# Patient Record
Sex: MALE | Race: WHITE | NOT HISPANIC OR LATINO | URBAN - METROPOLITAN AREA
[De-identification: names, ages, dates, MRNs, and addresses within clinical notes are randomized per-mention and may not be internally consistent; named-entity substitution may affect disease eponyms.]

---

## 2017-08-26 ENCOUNTER — EMERGENCY (EMERGENCY)
Facility: HOSPITAL | Age: 32
LOS: 1 days | Discharge: HOME | End: 2017-08-26
Attending: EMERGENCY MEDICINE | Admitting: EMERGENCY MEDICINE
Payer: MEDICARE

## 2017-08-26 VITALS
OXYGEN SATURATION: 100 % | TEMPERATURE: 99 F | RESPIRATION RATE: 16 BRPM | HEART RATE: 95 BPM | SYSTOLIC BLOOD PRESSURE: 136 MMHG | DIASTOLIC BLOOD PRESSURE: 95 MMHG

## 2017-08-26 DIAGNOSIS — Z92.89 PERSONAL HISTORY OF OTHER MEDICAL TREATMENT: Chronic | ICD-10-CM

## 2017-08-26 DIAGNOSIS — M67.00 SHORT ACHILLES TENDON (ACQUIRED), UNSPECIFIED ANKLE: Chronic | ICD-10-CM

## 2017-08-26 PROCEDURE — 99284 EMERGENCY DEPT VISIT MOD MDM: CPT

## 2017-08-26 PROCEDURE — 73600 X-RAY EXAM OF ANKLE: CPT | Mod: 26,LT

## 2017-08-26 PROCEDURE — 73590 X-RAY EXAM OF LOWER LEG: CPT | Mod: 26,LT

## 2017-08-26 PROCEDURE — 73620 X-RAY EXAM OF FOOT: CPT | Mod: 26,LT

## 2017-08-26 NOTE — ED PROVIDER NOTE - CARE PLAN
Principal Discharge DX:	Ankle pain  Instructions for follow-up, activity and diet:	Take motrin 600mg (3 advil) every 8 hours as needed for pain. Rest, ice, elevate extremity. Avoid any strenuous activity. Follow up with an orthopedic if symptoms persist. Return to ED for any worsening symptoms.

## 2017-08-26 NOTE — ED PROVIDER NOTE - MUSCULOSKELETAL, MLM
Spine appears normal, range of motion is not limited, appears to withdraw to pain when left lat mal is palpated

## 2017-08-26 NOTE — ED PROVIDER NOTE - PLAN OF CARE
Take motrin 600mg (3 advil) every 8 hours as needed for pain. Rest, ice, elevate extremity. Avoid any strenuous activity. Follow up with an orthopedic if symptoms persist. Return to ED for any worsening symptoms.

## 2017-08-26 NOTE — ED PROVIDER NOTE - PROGRESS NOTE DETAILS
ALE Lamb: Received sign out from Dr. Anthony. Prelim xrays neg for acute fx; will dc home with nsaids, ortho follow up.

## 2017-08-26 NOTE — ED ADULT TRIAGE NOTE - CHIEF COMPLAINT QUOTE
father states pt tripped and fell and left ankle rolled. Pt has been walking "gingerly" as per father

## 2017-08-26 NOTE — ED PROVIDER NOTE - OBJECTIVE STATEMENT
32y M with PMHx of developmental delay presents to the ED with left leg injury s/p fall today. Pt tripped coming out of kitchen and fell onto left side. Pt's father noticed he is favoring his right leg. No head injury, no LOC. No hx of fractures. Allergic to sulfa drugs.

## 2017-08-26 NOTE — ED PROVIDER NOTE - MEDICAL DECISION MAKING DETAILS
father concern that patient has left leg s/p fall.  patient unable to give further hx.  withdraws on pain at navicular.  will image lower left leg.  no other injuries noted

## 2018-07-05 ENCOUNTER — EMERGENCY (EMERGENCY)
Facility: HOSPITAL | Age: 33
LOS: 1 days | Discharge: HOME | End: 2018-07-05
Attending: EMERGENCY MEDICINE | Admitting: EMERGENCY MEDICINE
Payer: MEDICARE

## 2018-07-05 DIAGNOSIS — Z92.89 PERSONAL HISTORY OF OTHER MEDICAL TREATMENT: Chronic | ICD-10-CM

## 2018-07-05 DIAGNOSIS — M67.00 SHORT ACHILLES TENDON (ACQUIRED), UNSPECIFIED ANKLE: Chronic | ICD-10-CM

## 2018-07-05 PROCEDURE — 99282 EMERGENCY DEPT VISIT SF MDM: CPT

## 2018-07-05 RX ORDER — CIPROFLOXACIN AND DEXAMETHASONE 3; 1 MG/ML; MG/ML
4 SUSPENSION/ DROPS AURICULAR (OTIC)
Qty: 10 | Refills: 0
Start: 2018-07-05 | End: 2018-07-11

## 2018-07-05 NOTE — ED PROVIDER NOTE - CARE PLAN
Principal Discharge DX:	Ear discomfort, right  Assessment and plan of treatment:	Administer ear drops if any signs of infection noted

## 2018-07-05 NOTE — ED ADULT TRIAGE NOTE - NS ED NOTE AC HIGH RISK COUNTRIES
Updated note from [facility]:  Good afternoon Dr. Fredo Silva,               I have provided additional psychiatry referrals for the patient; however, none are not accepting worker's comp.  I have also reached out to other professionals and have had no luck in finding No

## 2018-07-05 NOTE — ED ADULT TRIAGE NOTE - CHIEF COMPLAINT QUOTE
Pt with history of MR, brought by father who witnessed a bug crawling into his right ear. Unable to obtain VS in triage

## 2018-07-05 NOTE — ED PROVIDER NOTE - ATTENDING CONTRIBUTION TO CARE
32M hx of MR presenting with possible foreign body in ear today. Father noticed a small beetle crawl in patient's ear this morning. Patient was noted to be picking at ear after. Unsure if insect came out or not. No report of fever, bleeding, complaint of pain, inability to hear. Patient has severe MR and cannot give history On exam: Severe MR, not grabbing at ear except when ear being examined.  R ext aud canal showed some blood, and minor abrasion but no FB or insect. Intact TM. DC home F/U ENT if symptomatic

## 2018-07-05 NOTE — ED PROVIDER NOTE - OBJECTIVE STATEMENT
32M hx of MR presenting with possible foreign body in ear today. Father noticed a small beetle crawl in patient's ear this morning. Patient was noted to be picking at ear after. Unsure if insect came out or not. No report of fever, bleeding, complaint of pain, inability to hear.

## 2020-11-05 ENCOUNTER — EMERGENCY (EMERGENCY)
Facility: HOSPITAL | Age: 35
LOS: 1 days | Discharge: HOME | End: 2020-11-05
Attending: STUDENT IN AN ORGANIZED HEALTH CARE EDUCATION/TRAINING PROGRAM | Admitting: STUDENT IN AN ORGANIZED HEALTH CARE EDUCATION/TRAINING PROGRAM
Payer: COMMERCIAL

## 2020-11-05 VITALS
DIASTOLIC BLOOD PRESSURE: 98 MMHG | SYSTOLIC BLOOD PRESSURE: 144 MMHG | HEART RATE: 98 BPM | OXYGEN SATURATION: 100 % | HEIGHT: 64 IN | RESPIRATION RATE: 18 BRPM | TEMPERATURE: 98 F

## 2020-11-05 DIAGNOSIS — Z92.89 PERSONAL HISTORY OF OTHER MEDICAL TREATMENT: Chronic | ICD-10-CM

## 2020-11-05 DIAGNOSIS — M67.00 SHORT ACHILLES TENDON (ACQUIRED), UNSPECIFIED ANKLE: Chronic | ICD-10-CM

## 2020-11-05 LAB
ALBUMIN SERPL ELPH-MCNC: 4.9 G/DL — SIGNIFICANT CHANGE UP (ref 3.3–5)
ALP SERPL-CCNC: 76 U/L — SIGNIFICANT CHANGE UP (ref 40–120)
ALT FLD-CCNC: 21 U/L — SIGNIFICANT CHANGE UP (ref 4–41)
ANION GAP SERPL CALC-SCNC: 10 MMO/L — SIGNIFICANT CHANGE UP (ref 7–14)
APTT BLD: 35.6 SEC — SIGNIFICANT CHANGE UP (ref 27–36.3)
AST SERPL-CCNC: 20 U/L — SIGNIFICANT CHANGE UP (ref 4–40)
BASOPHILS # BLD AUTO: 0.01 K/UL — SIGNIFICANT CHANGE UP (ref 0–0.2)
BASOPHILS NFR BLD AUTO: 0.2 % — SIGNIFICANT CHANGE UP (ref 0–2)
BILIRUB SERPL-MCNC: < 0.2 MG/DL — LOW (ref 0.2–1.2)
BUN SERPL-MCNC: 11 MG/DL — SIGNIFICANT CHANGE UP (ref 7–23)
CALCIUM SERPL-MCNC: 9.8 MG/DL — SIGNIFICANT CHANGE UP (ref 8.4–10.5)
CHLORIDE SERPL-SCNC: 102 MMOL/L — SIGNIFICANT CHANGE UP (ref 98–107)
CO2 SERPL-SCNC: 28 MMOL/L — SIGNIFICANT CHANGE UP (ref 22–31)
CREAT SERPL-MCNC: 0.71 MG/DL — SIGNIFICANT CHANGE UP (ref 0.5–1.3)
EOSINOPHIL # BLD AUTO: 0.04 K/UL — SIGNIFICANT CHANGE UP (ref 0–0.5)
EOSINOPHIL NFR BLD AUTO: 0.7 % — SIGNIFICANT CHANGE UP (ref 0–6)
GLUCOSE SERPL-MCNC: 94 MG/DL — SIGNIFICANT CHANGE UP (ref 70–99)
HCT VFR BLD CALC: 44.3 % — SIGNIFICANT CHANGE UP (ref 39–50)
HGB BLD-MCNC: 14.9 G/DL — SIGNIFICANT CHANGE UP (ref 13–17)
IMM GRANULOCYTES NFR BLD AUTO: 0.2 % — SIGNIFICANT CHANGE UP (ref 0–1.5)
INR BLD: 1.05 — SIGNIFICANT CHANGE UP (ref 0.88–1.16)
LYMPHOCYTES # BLD AUTO: 1.39 K/UL — SIGNIFICANT CHANGE UP (ref 1–3.3)
LYMPHOCYTES # BLD AUTO: 24.3 % — SIGNIFICANT CHANGE UP (ref 13–44)
MAGNESIUM SERPL-MCNC: 2 MG/DL — SIGNIFICANT CHANGE UP (ref 1.6–2.6)
MCHC RBC-ENTMCNC: 29.2 PG — SIGNIFICANT CHANGE UP (ref 27–34)
MCHC RBC-ENTMCNC: 33.6 % — SIGNIFICANT CHANGE UP (ref 32–36)
MCV RBC AUTO: 86.9 FL — SIGNIFICANT CHANGE UP (ref 80–100)
MONOCYTES # BLD AUTO: 0.53 K/UL — SIGNIFICANT CHANGE UP (ref 0–0.9)
MONOCYTES NFR BLD AUTO: 9.3 % — SIGNIFICANT CHANGE UP (ref 2–14)
NEUTROPHILS # BLD AUTO: 3.74 K/UL — SIGNIFICANT CHANGE UP (ref 1.8–7.4)
NEUTROPHILS NFR BLD AUTO: 65.3 % — SIGNIFICANT CHANGE UP (ref 43–77)
NRBC # FLD: 0 K/UL — SIGNIFICANT CHANGE UP (ref 0–0)
PHOSPHATE SERPL-MCNC: 3.1 MG/DL — SIGNIFICANT CHANGE UP (ref 2.5–4.5)
PLATELET # BLD AUTO: 185 K/UL — SIGNIFICANT CHANGE UP (ref 150–400)
PMV BLD: 9 FL — SIGNIFICANT CHANGE UP (ref 7–13)
POTASSIUM SERPL-MCNC: 3.7 MMOL/L — SIGNIFICANT CHANGE UP (ref 3.5–5.3)
POTASSIUM SERPL-SCNC: 3.7 MMOL/L — SIGNIFICANT CHANGE UP (ref 3.5–5.3)
PROT SERPL-MCNC: 7.2 G/DL — SIGNIFICANT CHANGE UP (ref 6–8.3)
PROTHROM AB SERPL-ACNC: 12.1 SEC — SIGNIFICANT CHANGE UP (ref 10.6–13.6)
RBC # BLD: 5.1 M/UL — SIGNIFICANT CHANGE UP (ref 4.2–5.8)
RBC # FLD: 12 % — SIGNIFICANT CHANGE UP (ref 10.3–14.5)
SODIUM SERPL-SCNC: 140 MMOL/L — SIGNIFICANT CHANGE UP (ref 135–145)
VALPROATE SERPL-MCNC: 73.1 UG/ML — SIGNIFICANT CHANGE UP (ref 50–100)
WBC # BLD: 5.72 K/UL — SIGNIFICANT CHANGE UP (ref 3.8–10.5)
WBC # FLD AUTO: 5.72 K/UL — SIGNIFICANT CHANGE UP (ref 3.8–10.5)

## 2020-11-05 PROCEDURE — 99285 EMERGENCY DEPT VISIT HI MDM: CPT

## 2020-11-05 RX ORDER — SODIUM CHLORIDE 9 MG/ML
1000 INJECTION, SOLUTION INTRAVENOUS ONCE
Refills: 0 | Status: COMPLETED | OUTPATIENT
Start: 2020-11-05 | End: 2020-11-05

## 2020-11-05 RX ORDER — LACOSAMIDE 50 MG/1
200 TABLET ORAL ONCE
Refills: 0 | Status: DISCONTINUED | OUTPATIENT
Start: 2020-11-05 | End: 2020-11-05

## 2020-11-05 RX ORDER — CLOBAZAM 10 MG/1
20 TABLET ORAL ONCE
Refills: 0 | Status: DISCONTINUED | OUTPATIENT
Start: 2020-11-05 | End: 2020-11-05

## 2020-11-05 RX ADMIN — LACOSAMIDE 200 MILLIGRAM(S): 50 TABLET ORAL at 16:00

## 2020-11-05 RX ADMIN — SODIUM CHLORIDE 1000 MILLILITER(S): 9 INJECTION, SOLUTION INTRAVENOUS at 15:22

## 2020-11-05 RX ADMIN — SODIUM CHLORIDE 1000 MILLILITER(S): 9 INJECTION, SOLUTION INTRAVENOUS at 16:00

## 2020-11-05 RX ADMIN — CLOBAZAM 20 MILLIGRAM(S): 10 TABLET ORAL at 16:00

## 2020-11-05 NOTE — ED PROVIDER NOTE - CARE PLAN
Principal Discharge DX:	Epilepsy   Principal Discharge DX:	Epilepsy  Assessment and plan of treatment:	During your ED visit you were evaluated. You had blood work completed and were provided with the results. Follow up with your pmd within 1 week. continue your medications as prior to your ED visit .

## 2020-11-05 NOTE — ED PROVIDER NOTE - PHYSICAL EXAMINATION
Gen: Awake, Alert, NAD  Head:  NC/AT  Eyes:  PERRL, EOMI, Conjunctiva pink, lids normal, no scleral icterus  ENT: OP clear, no exudates, no erythema, uvula midline, , moist mucus membranes  Neck: supple, nontender, no meningismus, no JVD, trachea midline  Cardiac/CV:  S1 S2, RRR, no M/G/R  Respiratory/Pulm:  CTAB, good air movement, normal resp effort, no wheezes/stridor/retractions/rales/rhonchi  Gastrointestinal/Abdomen:  Soft, nontender, nondistended, +BS, no rebound/guarding  Back:  no CVAT, no MLT  Ext:  warm, well perfused, moving all extremities spontaneously, no peripheral edema, distal pulses intact  Skin: intact, no rash  Neuro:  AAOx1 , sensation intact, motor 5/5 x 4 extremities, nonverbal

## 2020-11-05 NOTE — ED PROVIDER NOTE - CLINICAL SUMMARY MEDICAL DECISION MAKING FREE TEXT BOX
36 y/o M with PMH seizure disorder, HLD, cerebral palsy p/w 8 seizures while at his group home day care center. Pt nonverbal per ems he had a 6-8 peteite mal  seizures and 1 gtc while at his day care. Versed 10mg IM. by EMS. . Prior to arrival Per patients PMD he is currently on   Currently on depakote 125mg am/noon, 250mg qhs, onfi 20mg 4x daily, vimpat, 200mg bid  may have received non brand name medications which increases his risk of seizure . Pt alert at baseline per family at bedside.   will check vaproate level, vbg, cbc, cmp, give ivf, reassess likely d/c with pmd follow up

## 2020-11-05 NOTE — ED PROVIDER NOTE - PROGRESS NOTE DETAILS
Jose Luis Freeman, PGY 3: Received sign out on patient. pending labs, and home meds given. pt improved, at baseline per father. will d/c with pcp follow up

## 2020-11-05 NOTE — ED ADULT NURSE NOTE - CHIEF COMPLAINT QUOTE
Patient brought to ER from group home by EMS for 10 witnessed seizures. Pt given Versed 10mg IM. by EMS. .

## 2020-11-05 NOTE — ED ADULT NURSE NOTE - NSFALLRSKASSISTTYPE_ED_ALL_ED
Toileting/Standing/Walking What Type Of Note Output Would You Prefer (Optional)?: Standard Output How Severe Is Your Skin Lesion?: mild Has Your Skin Lesion Been Treated?: not been treated Is This A New Presentation, Or A Follow-Up?: Skin Lesion

## 2020-11-05 NOTE — ED PROVIDER NOTE - ATTENDING CONTRIBUTION TO CARE
attending wrote note  Dr. Elizalde: I have personally seen and examined this patient at the bedside. I have fully participated in the care of this patient. I have reviewed all pertinent clinical information, including history, physical exam, plan and the Resident's note and agree except as noted. HPI above as by me. PE above as by me. DDX PLAN

## 2020-11-05 NOTE — ED PROVIDER NOTE - PATIENT PORTAL LINK FT
You can access the FollowMyHealth Patient Portal offered by [facility] by registering at the following website: [email or web address]. By joining [facility]’s FollowMyHealth portal, you will also be able to view your health information using other applications (apps) compatible with our system.

## 2020-11-05 NOTE — ED ADULT NURSE NOTE - OBJECTIVE STATEMENT
Pt presents to ED via EMS from day facility s/p 10 wittnessed seizures. Per EMS, pt had 8 seizures at the facility before their arrival. EMS wittnessed 2 additional seizures and administered 10mg IM versed. Upon arrival to ED pt is awake and alert and at baseline per father. Pt is developmentally delayed. Per father, pt sometimes has breakthrough seizures when his medications are changed from name brand to generic. Pt's father states approx 1  year ago the Vinpat was changed to generic. Pt is nonverbal at baseline. Pt in no apparent distress or pain. Pt changed into gown and placed on cardiac monitor. #18g IV placed to [facility], lab work collected as ordered. Will continue to monitor.

## 2020-11-05 NOTE — ED ADULT NURSE NOTE - NSIMPLEMENTINTERV_GEN_ALL_ED
Implemented All Fall Risk Interventions:  [place] to call system. Call bell, personal items and telephone within reach. Instruct patient to call for assistance. Room bathroom lighting operational. Non-slip footwear when patient is off stretcher. Physically safe environment: no spills, clutter or unnecessary equipment. Stretcher in lowest position, wheels locked, appropriate side rails in place. Provide visual cue, wrist band, yellow gown, etc. Monitor gait and stability. Monitor for mental status changes and reorient to person, place, and time. Review medications for side effects contributing to fall risk. Reinforce activity limits and safety measures with patient and family.

## 2020-11-05 NOTE — ED PROVIDER NOTE - NSFOLLOWUPINSTRUCTIONS_ED_ALL_ED_FT
During your ED visit you were evaluated. You had blood work completed and were provided with the results. Follow up with your pmd within 1 week. continue your medications as prior to your ED visit .

## 2020-11-05 NOTE — ED PROVIDER NOTE - OBJECTIVE STATEMENT
34 y/o M with PMH seizure disorder, HLD, cerebral palsy p/w 8 seizures while at his group home day care center. Pt nonverbal per ems he had a 6-8 peteite mal  seizures and 1 gtc while at his day care. Versed 10mg IM. by EMS. . Prior to arrival Per patients PMD he is currently on   Currently on depakote 125mg am/noon, 250mg qhs, onfi 20mg 4x daily, vimpat, 200mg bid  may have received non brand name medications which increases his risk of seizure . Pt alert at baseline per family at bedside.

## 2021-02-04 ENCOUNTER — EMERGENCY (EMERGENCY)
Facility: HOSPITAL | Age: 36
LOS: 1 days | Discharge: HOME | End: 2021-02-04
Attending: STUDENT IN AN ORGANIZED HEALTH CARE EDUCATION/TRAINING PROGRAM | Admitting: STUDENT IN AN ORGANIZED HEALTH CARE EDUCATION/TRAINING PROGRAM
Payer: COMMERCIAL

## 2021-02-04 VITALS
OXYGEN SATURATION: 95 % | HEART RATE: 62 BPM | TEMPERATURE: 98 F | SYSTOLIC BLOOD PRESSURE: 138 MMHG | DIASTOLIC BLOOD PRESSURE: 90 MMHG | RESPIRATION RATE: 16 BRPM | HEIGHT: 64 IN

## 2021-02-04 DIAGNOSIS — M67.00 SHORT ACHILLES TENDON (ACQUIRED), UNSPECIFIED ANKLE: Chronic | ICD-10-CM

## 2021-02-04 DIAGNOSIS — Z92.89 PERSONAL HISTORY OF OTHER MEDICAL TREATMENT: Chronic | ICD-10-CM

## 2021-02-04 PROCEDURE — 73552 X-RAY EXAM OF FEMUR 2/>: CPT | Mod: 26,RT

## 2021-02-04 PROCEDURE — 73610 X-RAY EXAM OF ANKLE: CPT | Mod: 26,RT

## 2021-02-04 PROCEDURE — 73590 X-RAY EXAM OF LOWER LEG: CPT | Mod: 26,RT

## 2021-02-04 PROCEDURE — 99283 EMERGENCY DEPT VISIT LOW MDM: CPT

## 2021-02-04 PROCEDURE — 73502 X-RAY EXAM HIP UNI 2-3 VIEWS: CPT | Mod: 26,RT

## 2021-02-04 PROCEDURE — 73630 X-RAY EXAM OF FOOT: CPT | Mod: 26,RT

## 2021-02-04 PROCEDURE — 73562 X-RAY EXAM OF KNEE 3: CPT | Mod: 26,RT

## 2021-02-04 NOTE — ED ADULT TRIAGE NOTE - CHIEF COMPLAINT QUOTE
Pt presents to ED from home via wheelchair s/p fall. Pt fell on Sunday and since then has had difficulty with ambulating. Pt is developmentally delayed and comes to ED with home health aid and father. Pt is nonverbal at baseline. Pt hx of seizures. Pt has slight swelling to R foot. Junaid Santoro (dad) [phone]

## 2021-02-04 NOTE — ED PROVIDER NOTE - OBJECTIVE STATEMENT
35 year old male accompanied by father hx of seizure disorder, nonverbal per baseline presents for RLE injury.  Father states that 4 days ago patient tripped and fell, landing on right side and since has noted a limp when patient walks and noted swelling and warmth to right ankle and is requesting patient has XRs to assess for fx.  Father states that he was around the corner when patient fell, but does not believe that patient hit head or injured any other area of body and has good upper body strength.   Father states that patient is non-verbal so unable to communicate where pain is, however is mentally at baseline and does not believe that patient currently has any pain.

## 2021-02-04 NOTE — ED PROVIDER NOTE - PHYSICAL EXAMINATION
GEN:   comfortable, in no apparent distress, at mental baseline per father (non-verbal)  EYES:   PERRL, extra-occular movements intact  RESP:   clear to auscultation bilaterally, non-labored  ABD:   soft, non tender, no guarding  :   no cva tenderness  MSK:   pelvic stable, no change in demeanor upon palpation to extremities, moving all extremities, able to ambulate currently per baseline.  Right ankle / foot warmth and mild swelling to medial malleolus region.   Moving toes, no apparent sensory loss, pedal pulses intact, cap refill under 2 seconds.  No obvious deformity or ecchymosis.   SKIN:   dry, intact, no rash  NEURO:   no apparent focal weakness or loss of sensation, gait normal per baseline per father.  PSYCH: calm, cooperative

## 2021-02-04 NOTE — ED PROVIDER NOTE - ATTENDING CONTRIBUTION TO CARE
Mauor STEEN: I agree with the above provided history and exam and addend/modify it as follows.    35M w/ pmh sz, CP, dev delay - p/w fall - tripped over a toy Sunday night, dad saw the patient immediately after. However has had slight limp while ambulating afterwards worse than baseline. Pt's dad reports otherwise pt has been acting normally, no tiredness, vomiting, dizziness, coughing, irritability, etc.    On exam R ankle warmth and swelling. able to passively range bilat LE and hips w/out any discomfort. atraumatic head, patient is awake, alert, not following commands.    plan to check xr r/o mechanical RLE fracture, although low suspicon; low suspicion for ICH given baseline mentation, likely dc w/ close outpt f/u    I Ti Wade MD performed a history and physical exam of the patient and discussed their management with the resident and /or advanced care provider. I reviewed the resident and /or ACP's note and agree with the documented findings and plan of care. My medical decision making and observations are found above.

## 2021-02-04 NOTE — ED PROVIDER NOTE - PROGRESS NOTE DETAILS
1% lidocaine
Ti Wade MD: patient previously unable to get XR by [facility] was not compliant - father then went to help pt get XR. Father adamant wants to leave w/ pt now (befor xray resulted or read) because he is antsy - will follow up on xray results tomorrow. That's okay by me, will discharge

## 2021-02-04 NOTE — ED PROVIDER NOTE - CLINICAL SUMMARY MEDICAL DECISION MAKING FREE TEXT BOX
35 year old male accompanied by father hx of seizure disorder, nonverbal per baseline presents for RLE injury, on exam possibly right ankle vs foot injury but due to patient unable to communicate location of pain and father stating patient fell onto right side with resultant limp, will XR entire RLE.  At this time no indication for CT head / spine or upper extremity imaging.   Tylenol offered but father states patient does not appear in pain and declined.

## 2021-02-05 NOTE — ED POST DISCHARGE NOTE - RESULT SUMMARY
ALE Joseph: Xray R foot: Suggestion of a comminuted fracture of the right fifth proximal phalanx with questionable periosteal reaction may be subacute in nature, correlate for site of pain. Patients father called, asked him to look at the foot, particularly 5th phalanx, and evaluate for any erythema or bruising, as well as tenderness. Dad states pt is nontender. Bearing weight on the leg as usual. Pt appears better today in comparison to pain he had yesterday. Explained this may be an old fx. Dad states pt is always barefoot and banging his feet into walls, so that may be a possibility. Recommended podiatry follow up. Dad is upset regarding his wait in the ED yesterday. Understands the volume was high, but states he was never told the first set of xrays were unsuccessful, and waited 1 hour to go for a repeat.

## 2022-03-23 NOTE — ED ADULT NURSE NOTE - CAS DISCH BELONGINGS RETURNED
Group Topic:  Education    Date: 3/23/2022  Start Time: 1030  End Time: 1115  Facilitators: Lynn Hoskins MS; Henok Gardner      Number in attendance: 7  Pt was recruited for group but did not attend. Efforts to encourage participation in programming on the unit will continue.   Lynn Hoskins, MS         Not applicable

## 2024-02-22 ENCOUNTER — OUTPATIENT (OUTPATIENT)
Dept: OUTPATIENT SERVICES | Facility: HOSPITAL | Age: 39
LOS: 1 days | End: 2024-02-22
Payer: MEDICARE

## 2024-02-22 VITALS — WEIGHT: 151.9 LBS | HEIGHT: 64 IN | TEMPERATURE: 98 F

## 2024-02-22 DIAGNOSIS — Z92.89 PERSONAL HISTORY OF OTHER MEDICAL TREATMENT: Chronic | ICD-10-CM

## 2024-02-22 DIAGNOSIS — G40.909 EPILEPSY, UNSPECIFIED, NOT INTRACTABLE, WITHOUT STATUS EPILEPTICUS: ICD-10-CM

## 2024-02-22 DIAGNOSIS — M67.00 SHORT ACHILLES TENDON (ACQUIRED), UNSPECIFIED ANKLE: Chronic | ICD-10-CM

## 2024-02-22 DIAGNOSIS — K08.9 DISORDER OF TEETH AND SUPPORTING STRUCTURES, UNSPECIFIED: ICD-10-CM

## 2024-02-22 DIAGNOSIS — K02.62 DENTAL CARIES ON SMOOTH SURFACE PENETRATING INTO DENTIN: ICD-10-CM

## 2024-02-22 LAB
ALBUMIN SERPL ELPH-MCNC: 4.6 G/DL — SIGNIFICANT CHANGE UP (ref 3.3–5)
ALP SERPL-CCNC: 86 U/L — SIGNIFICANT CHANGE UP (ref 40–120)
ALT FLD-CCNC: 16 U/L — SIGNIFICANT CHANGE UP (ref 10–45)
ANION GAP SERPL CALC-SCNC: 12 MMOL/L — SIGNIFICANT CHANGE UP (ref 5–17)
AST SERPL-CCNC: 16 U/L — SIGNIFICANT CHANGE UP (ref 10–40)
BILIRUB SERPL-MCNC: 0.2 MG/DL — SIGNIFICANT CHANGE UP (ref 0.2–1.2)
BUN SERPL-MCNC: 13 MG/DL — SIGNIFICANT CHANGE UP (ref 7–23)
CALCIUM SERPL-MCNC: 10 MG/DL — SIGNIFICANT CHANGE UP (ref 8.4–10.5)
CHLORIDE SERPL-SCNC: 101 MMOL/L — SIGNIFICANT CHANGE UP (ref 96–108)
CO2 SERPL-SCNC: 26 MMOL/L — SIGNIFICANT CHANGE UP (ref 22–31)
CREAT SERPL-MCNC: 0.72 MG/DL — SIGNIFICANT CHANGE UP (ref 0.5–1.3)
EGFR: 121 ML/MIN/1.73M2 — SIGNIFICANT CHANGE UP
GLUCOSE SERPL-MCNC: 98 MG/DL — SIGNIFICANT CHANGE UP (ref 70–99)
HCT VFR BLD CALC: 41.7 % — SIGNIFICANT CHANGE UP (ref 39–50)
HGB BLD-MCNC: 14.1 G/DL — SIGNIFICANT CHANGE UP (ref 13–17)
MCHC RBC-ENTMCNC: 29 PG — SIGNIFICANT CHANGE UP (ref 27–34)
MCHC RBC-ENTMCNC: 33.8 GM/DL — SIGNIFICANT CHANGE UP (ref 32–36)
MCV RBC AUTO: 85.6 FL — SIGNIFICANT CHANGE UP (ref 80–100)
NRBC # BLD: 0 /100 WBCS — SIGNIFICANT CHANGE UP (ref 0–0)
PLATELET # BLD AUTO: 205 K/UL — SIGNIFICANT CHANGE UP (ref 150–400)
POTASSIUM SERPL-MCNC: 4.1 MMOL/L — SIGNIFICANT CHANGE UP (ref 3.5–5.3)
POTASSIUM SERPL-SCNC: 4.1 MMOL/L — SIGNIFICANT CHANGE UP (ref 3.5–5.3)
PROT SERPL-MCNC: 7.2 G/DL — SIGNIFICANT CHANGE UP (ref 6–8.3)
RBC # BLD: 4.87 M/UL — SIGNIFICANT CHANGE UP (ref 4.2–5.8)
RBC # FLD: 12.7 % — SIGNIFICANT CHANGE UP (ref 10.3–14.5)
SODIUM SERPL-SCNC: 139 MMOL/L — SIGNIFICANT CHANGE UP (ref 135–145)
WBC # BLD: 8.09 K/UL — SIGNIFICANT CHANGE UP (ref 3.8–10.5)
WBC # FLD AUTO: 8.09 K/UL — SIGNIFICANT CHANGE UP (ref 3.8–10.5)

## 2024-02-22 PROCEDURE — 85027 COMPLETE CBC AUTOMATED: CPT

## 2024-02-22 PROCEDURE — G0463: CPT

## 2024-02-22 PROCEDURE — 80053 COMPREHEN METABOLIC PANEL: CPT

## 2024-02-22 RX ORDER — LIDOCAINE HCL 20 MG/ML
0.2 VIAL (ML) INJECTION ONCE
Refills: 0 | Status: DISCONTINUED | OUTPATIENT
Start: 2024-03-13 | End: 2024-03-27

## 2024-02-22 NOTE — H&P PST ADULT - ADDITIONAL PE
limited physical examination performed with patient in wheelchair. Pt. pulling at health care workers extremities etc.

## 2024-02-22 NOTE — H&P PST ADULT - NSICDXFAMHXNEG_GEN_ALL
asthma/brain aneurysm/cancer/congestive heart failure/COPD/coronary disease/dementia/emphysema/irritable bowel syndrome/kidney disease/stroke/thyroid disease/VTE

## 2024-02-22 NOTE — H&P PST ADULT - PROBLEM SELECTOR PLAN 2
Pt. takes medications with applesauce. Message left at Dr. Amando Sandoval's office with Swetha regarding recommendations for medications day of procedure.

## 2024-02-22 NOTE — H&P PST ADULT - HISTORY OF PRESENT ILLNESS
38 year old, non verbal male, presents for comprehensive dental evaluation with anesthesia. Pt. with developmental delay and history of epilepsy Parent reports the patient experiences a focal seizure approximately every 3 weeks. Denies recent history of grand mal seizure. Pt. resides at home with parent and attends day program. Minimal mobility with assistance.    Father denies history of COVID infection.

## 2024-02-22 NOTE — H&P PST ADULT - NSICDXPASTMEDICALHX_GEN_ALL_CORE_FT
PAST MEDICAL HISTORY:  Developmental delay     Epilepsy      PAST MEDICAL HISTORY:  Developmental delay     Epilepsy     Static encephalopathy

## 2024-02-22 NOTE — H&P PST ADULT - PROBLEM SELECTOR PLAN 1
Comprehensive dental treatment under anesthesia Comprehensive dental treatment under anesthesia    Pt. uncooperative during PST evaluation; fearful of medical staff, pulling at equipment etc.  Case reviewed with Dr. Simons, recommendation for case to be redirected to main OR for first case. Dr. Waters notified via email.

## 2024-02-22 NOTE — H&P PST ADULT - NSANTHOSAYNRD_GEN_A_CORE
No. VALDEMAR screening performed.  STOP BANG Legend: 0-2 = LOW Risk; 3-4 = INTERMEDIATE Risk; 5-8 = HIGH Risk

## 2024-03-12 ENCOUNTER — TRANSCRIPTION ENCOUNTER (OUTPATIENT)
Age: 39
End: 2024-03-12

## 2024-03-13 ENCOUNTER — OUTPATIENT (OUTPATIENT)
Dept: INPATIENT UNIT | Facility: HOSPITAL | Age: 39
LOS: 1 days | End: 2024-03-13
Payer: MEDICARE

## 2024-03-13 ENCOUNTER — TRANSCRIPTION ENCOUNTER (OUTPATIENT)
Age: 39
End: 2024-03-13

## 2024-03-13 VITALS
SYSTOLIC BLOOD PRESSURE: 133 MMHG | OXYGEN SATURATION: 99 % | TEMPERATURE: 97 F | DIASTOLIC BLOOD PRESSURE: 88 MMHG | HEART RATE: 88 BPM | RESPIRATION RATE: 18 BRPM

## 2024-03-13 VITALS
OXYGEN SATURATION: 100 % | HEART RATE: 76 BPM | SYSTOLIC BLOOD PRESSURE: 129 MMHG | HEIGHT: 64 IN | RESPIRATION RATE: 16 BRPM | TEMPERATURE: 98 F | WEIGHT: 151.9 LBS | DIASTOLIC BLOOD PRESSURE: 80 MMHG

## 2024-03-13 DIAGNOSIS — Z92.89 PERSONAL HISTORY OF OTHER MEDICAL TREATMENT: Chronic | ICD-10-CM

## 2024-03-13 DIAGNOSIS — M67.00 SHORT ACHILLES TENDON (ACQUIRED), UNSPECIFIED ANKLE: Chronic | ICD-10-CM

## 2024-03-13 DIAGNOSIS — K02.62 DENTAL CARIES ON SMOOTH SURFACE PENETRATING INTO DENTIN: ICD-10-CM

## 2024-03-13 PROCEDURE — C9399: CPT

## 2024-03-13 PROCEDURE — D0120: CPT

## 2024-03-13 PROCEDURE — D4341: CPT

## 2024-03-13 PROCEDURE — D0210: CPT

## 2024-03-13 PROCEDURE — D1206: CPT

## 2024-03-13 PROCEDURE — D4210: CPT

## 2024-03-13 PROCEDURE — C1889: CPT

## 2024-03-13 DEVICE — SURGICEL 2 X 14": Type: IMPLANTABLE DEVICE | Status: FUNCTIONAL

## 2024-03-13 RX ORDER — ACETAMINOPHEN 500 MG
1000 TABLET ORAL ONCE
Refills: 0 | Status: DISCONTINUED | OUTPATIENT
Start: 2024-03-13 | End: 2024-03-13

## 2024-03-13 RX ORDER — MIDAZOLAM HYDROCHLORIDE 1 MG/ML
1 INJECTION, SOLUTION INTRAMUSCULAR; INTRAVENOUS
Refills: 0 | DISCHARGE

## 2024-03-13 RX ORDER — OXYCODONE HYDROCHLORIDE 5 MG/1
10 TABLET ORAL ONCE
Refills: 0 | Status: DISCONTINUED | OUTPATIENT
Start: 2024-03-13 | End: 2024-03-13

## 2024-03-13 RX ORDER — ONDANSETRON 8 MG/1
4 TABLET, FILM COATED ORAL ONCE
Refills: 0 | Status: DISCONTINUED | OUTPATIENT
Start: 2024-03-13 | End: 2024-03-13

## 2024-03-13 RX ORDER — FENTANYL CITRATE 50 UG/ML
50 INJECTION INTRAVENOUS
Refills: 0 | Status: DISCONTINUED | OUTPATIENT
Start: 2024-03-13 | End: 2024-03-13

## 2024-03-13 RX ORDER — CLOBAZAM 10 MG/1
2 TABLET ORAL
Refills: 0 | DISCHARGE

## 2024-03-13 RX ORDER — OXYCODONE HYDROCHLORIDE 5 MG/1
5 TABLET ORAL ONCE
Refills: 0 | Status: DISCONTINUED | OUTPATIENT
Start: 2024-03-13 | End: 2024-03-13

## 2024-03-13 RX ORDER — SODIUM CHLORIDE 9 MG/ML
3 INJECTION INTRAMUSCULAR; INTRAVENOUS; SUBCUTANEOUS EVERY 8 HOURS
Refills: 0 | Status: DISCONTINUED | OUTPATIENT
Start: 2024-03-13 | End: 2024-03-13

## 2024-03-13 RX ORDER — DIVALPROEX SODIUM 500 MG/1
1 TABLET, DELAYED RELEASE ORAL
Refills: 0 | DISCHARGE

## 2024-03-13 RX ORDER — DIVALPROEX SODIUM 500 MG/1
2 TABLET, DELAYED RELEASE ORAL
Refills: 0 | DISCHARGE

## 2024-03-13 NOTE — ASU DISCHARGE PLAN (ADULT/PEDIATRIC) - ASU DC SPECIAL INSTRUCTIONSFT
comprehensive dental treatment under general anesthesia     tylenol for the next few days for pain or discomfort    exam, xrays, periodontal treatments and [place] performed     see DR Waters on 3/19 at 10:30 am, appt is set in [facility]

## (undated) DEVICE — DRAPE LIGHT HANDLE COVER (BLUE)

## (undated) DEVICE — POSITIONER FOAM EGG CRATE ULNAR 2PCS (PINK)

## (undated) DEVICE — SOL IRR POUR NS 0.9% 500ML

## (undated) DEVICE — SPECIMEN CONTAINER 100ML

## (undated) DEVICE — GLV 6.5 PROTEXIS (WHITE)

## (undated) DEVICE — DRAPE MAYO STAND 30"

## (undated) DEVICE — GLV 7 PROTEXIS (WHITE)

## (undated) DEVICE — LAP PAD 18 X 18"

## (undated) DEVICE — WARMING BLANKET LOWER ADULT

## (undated) DEVICE — VENODYNE/SCD SLEEVE CALF MEDIUM

## (undated) DEVICE — MEDICATION LABELS W MARKER

## (undated) DEVICE — SUCTION YANKAUER OPEN TIP NO VENT CURVE

## (undated) DEVICE — TONSIL ROLLS

## (undated) DEVICE — ELCTR BOVIE PENCIL SMOKE EVACUATION

## (undated) DEVICE — SOL IRR POUR H2O 250ML

## (undated) DEVICE — VAGINAL PACKING 2 X 6"

## (undated) DEVICE — ELCTR REM POLYHESIVE ADULT PT RETURN 15FT

## (undated) DEVICE — TUBING SUCTION 20FT

## (undated) DEVICE — VISITEC 4X4

## (undated) DEVICE — PACK BASIC GOWN

## (undated) DEVICE — DRAPE TOWEL BLUE STICKY

## (undated) DEVICE — GLV 7.5 PROTEXIS (WHITE)

## (undated) DEVICE — DRAPE 3/4 SHEET W REINFORCEMENT 56X77"

## (undated) DEVICE — GOWN TRIMAX LG

## (undated) DEVICE — DRAPE INSTRUMENT POUCH 6.75" X 11"